# Patient Record
Sex: FEMALE | Race: WHITE
[De-identification: names, ages, dates, MRNs, and addresses within clinical notes are randomized per-mention and may not be internally consistent; named-entity substitution may affect disease eponyms.]

---

## 2019-07-17 ENCOUNTER — HOSPITAL ENCOUNTER (INPATIENT)
Dept: HOSPITAL 26 - MED | Age: 39
LOS: 3 days | Discharge: HOME | DRG: 720 | End: 2019-07-20
Attending: GENERAL PRACTICE | Admitting: GENERAL PRACTICE
Payer: MEDICAID

## 2019-07-17 VITALS — DIASTOLIC BLOOD PRESSURE: 83 MMHG | SYSTOLIC BLOOD PRESSURE: 115 MMHG

## 2019-07-17 VITALS — SYSTOLIC BLOOD PRESSURE: 106 MMHG | DIASTOLIC BLOOD PRESSURE: 68 MMHG

## 2019-07-17 VITALS — DIASTOLIC BLOOD PRESSURE: 94 MMHG | SYSTOLIC BLOOD PRESSURE: 146 MMHG

## 2019-07-17 VITALS — HEIGHT: 61 IN | BODY MASS INDEX: 21.52 KG/M2 | WEIGHT: 114 LBS

## 2019-07-17 DIAGNOSIS — E78.2: ICD-10-CM

## 2019-07-17 DIAGNOSIS — E87.6: ICD-10-CM

## 2019-07-17 DIAGNOSIS — Z83.3: ICD-10-CM

## 2019-07-17 DIAGNOSIS — Z82.49: ICD-10-CM

## 2019-07-17 DIAGNOSIS — E11.51: ICD-10-CM

## 2019-07-17 DIAGNOSIS — N13.6: ICD-10-CM

## 2019-07-17 DIAGNOSIS — Z86.73: ICD-10-CM

## 2019-07-17 DIAGNOSIS — E83.42: ICD-10-CM

## 2019-07-17 DIAGNOSIS — Z88.0: ICD-10-CM

## 2019-07-17 DIAGNOSIS — A41.9: Primary | ICD-10-CM

## 2019-07-17 DIAGNOSIS — Z98.891: ICD-10-CM

## 2019-07-17 DIAGNOSIS — E11.69: ICD-10-CM

## 2019-07-17 LAB
ALBUMIN FLD-MCNC: 3.7 G/DL (ref 3.4–5)
AMYLASE SERPL-CCNC: 40 U/L (ref 25–115)
AMYLASE SERPL-CCNC: 40 U/L (ref 25–115)
ANION GAP SERPL CALCULATED.3IONS-SCNC: 13.9 MMOL/L (ref 8–16)
ANION GAP SERPL CALCULATED.3IONS-SCNC: 16.3 MMOL/L (ref 8–16)
APPEARANCE UR: CLEAR
AST SERPL-CCNC: 20 U/L (ref 15–37)
BARBITURATES UR QL SCN: (no result) NG/ML
BENZODIAZ UR QL SCN: (no result) NG/ML
BILIRUB SERPL-MCNC: 0.5 MG/DL (ref 0–1)
BILIRUB UR QL STRIP: NEGATIVE
BUN SERPL-MCNC: 12 MG/DL (ref 7–18)
BUN SERPL-MCNC: 13 MG/DL (ref 7–18)
BZE UR QL SCN: (no result) NG/ML
CANNABINOIDS UR QL SCN: (no result) NG/ML
CHLORIDE SERPL-SCNC: 101 MMOL/L (ref 98–107)
CHLORIDE SERPL-SCNC: 105 MMOL/L (ref 98–107)
CHOLEST/HDLC SERPL: 4.2 {RATIO} (ref 1–4.5)
CO2 SERPL-SCNC: 25.7 MMOL/L (ref 21–32)
CO2 SERPL-SCNC: 26.5 MMOL/L (ref 21–32)
COLOR UR: YELLOW
CREAT SERPL-MCNC: 1 MG/DL (ref 0.6–1.3)
CREAT SERPL-MCNC: 1 MG/DL (ref 0.6–1.3)
ERYTHROCYTE [DISTWIDTH] IN BLOOD BY AUTOMATED COUNT: 12.8 % (ref 11.6–13.7)
GFR SERPL CREATININE-BSD FRML MDRD: 79 ML/MIN (ref 90–?)
GFR SERPL CREATININE-BSD FRML MDRD: 79 ML/MIN (ref 90–?)
GLUCOSE SERPL-MCNC: 336 MG/DL (ref 74–106)
GLUCOSE SERPL-MCNC: 442 MG/DL (ref 74–106)
GLUCOSE UR STRIP-MCNC: (no result) MG/DL
HCT VFR BLD AUTO: 43.7 % (ref 36–48)
HDLC SERPL-MCNC: 55 MG/DL (ref 40–60)
HGB BLD-MCNC: 14.6 G/DL (ref 12–16)
HGB UR QL STRIP: NEGATIVE
LDLC SERPL CALC-MCNC: 149 MG/DL (ref 60–100)
LEUKOCYTE ESTERASE UR QL STRIP: NEGATIVE
LIPASE SERPL-CCNC: 124 U/L (ref 73–393)
LIPASE SERPL-CCNC: 124 U/L (ref 73–393)
LYMPHOCYTES NFR BLD MANUAL: 6 % (ref 20–46)
MAGNESIUM SERPL-MCNC: 1.9 MG/DL (ref 1.8–2.4)
MCH RBC QN AUTO: 27 PG (ref 27–31)
MCHC RBC AUTO-ENTMCNC: 33 G/DL (ref 33–37)
MCV RBC AUTO: 82.1 FL (ref 80–94)
NITRITE UR QL STRIP: NEGATIVE
OPIATES UR QL SCN: (no result) NG/ML
PCP UR QL SCN: (no result) NG/ML
PH UR STRIP: 6 [PH] (ref 5–9)
PHOSPHATE SERPL-MCNC: 3.7 MG/DL (ref 2.5–4.9)
PLATELET # BLD AUTO: 317 K/UL (ref 140–450)
POTASSIUM SERPL-SCNC: 3 MMOL/L (ref 3.5–5.1)
POTASSIUM SERPL-SCNC: 3.4 MMOL/L (ref 3.5–5.1)
PROTHROMBIN TIME: 9.9 SECS (ref 10.8–13.4)
RBC # BLD AUTO: 5.32 MIL/UL (ref 4.2–5.4)
RBC #/AREA URNS HPF: (no result) /HPF (ref 0–5)
SODIUM SERPL-SCNC: 140 MMOL/L (ref 136–145)
SODIUM SERPL-SCNC: 142 MMOL/L (ref 136–145)
TRIGL SERPL-MCNC: 145 MG/DL (ref 30–150)
TSH SERPL DL<=0.05 MIU/L-ACNC: 0.98 UIU/ML (ref 0.34–3.74)
WBC # BLD AUTO: 9 K/UL (ref 4.8–10.8)
WBC,URINE: (no result) /HPF (ref 0–5)

## 2019-07-17 RX ADMIN — ACETAMINOPHEN PRN MG: 325 TABLET ORAL at 18:09

## 2019-07-17 RX ADMIN — SODIUM CHLORIDE SCH MLS/HR: 9 INJECTION, SOLUTION INTRAVENOUS at 18:49

## 2019-07-17 RX ADMIN — SODIUM CHLORIDE SCH MLS/HR: 9 INJECTION, SOLUTION INTRAVENOUS at 10:52

## 2019-07-17 RX ADMIN — INSULIN LISPRO PRN UNITS: 100 INJECTION, SOLUTION INTRAVENOUS; SUBCUTANEOUS at 18:21

## 2019-07-17 RX ADMIN — Medication SCH DEV: at 20:57

## 2019-07-17 RX ADMIN — INSULIN LISPRO PRN UNITS: 100 INJECTION, SOLUTION INTRAVENOUS; SUBCUTANEOUS at 21:02

## 2019-07-17 RX ADMIN — Medication SCH DEV: at 16:30

## 2019-07-17 RX ADMIN — KETOROLAC TROMETHAMINE SCH MG: 15 INJECTION, SOLUTION INTRAMUSCULAR; INTRAVENOUS at 18:09

## 2019-07-17 RX ADMIN — INSULIN LISPRO PRN UNITS: 100 INJECTION, SOLUTION INTRAVENOUS; SUBCUTANEOUS at 14:04

## 2019-07-17 NOTE — NUR
ADMINISTERED SCHEDULED MEDICATIONS, BUT HELD HUMULIN R IVP WHICH WE DO NOT CARRY ON THE 
FLOOR. DOCTOR AWARE. PER DR. RAMSEY. RECHECK GLUCOSE AND COVER WITH SLIDING SCALE HUMALOG SQ. 
ADMINISTERED 8 UNITS OF HUMALOG FOR GLUCOSE . PATIENT TOLERATED WELL. IV INFUSING WELL 
TO LEFT AC.

## 2019-07-17 NOTE — NUR
PAIN, FEVER, CHILLS AND NAUSEA HAVE ALL SUBSIDED. PATIENT COMFORTABLE, RESTING IN BED. 
FAMILY AT BEDSIDE.

## 2019-07-17 NOTE — NUR
RECIEVED PT AAOX4 NID , WITH BEARABLE PAIN AT THIS TIME , IV SITE INTACT AND PATENT , PLAN 
OF CARE DISCUSSED AND VERBALIZE UNDERSTANDING , CALL LIGHT WITHIN RFEACH , BED I N LOW 
POSITION , SIDERAILS UPX2 ,REALTIVES TA BEDSIDE.

## 2019-07-17 NOTE — NUR
Patient will be admitted to care of DR. CURIEL. Admited to [facility] .  Will go to 
room 121 A. Belongings list completed.  Report to ROBBY SCHULTZ. PT STABLE AT THE 
TIME OF TRANSFER.

## 2019-07-17 NOTE — NUR
MADE ROUNDS , RESPIRATION EVEN AND UNLABORED , NO FURTHER COMPLAIN MADE AT THIS TIME , CALL 
LIGHT WITHIN REACH .WILL CONT. TO MONITOR.

## 2019-07-17 NOTE — NUR
C/O ABD PAIN SINCE 05:00 THIS MORNING. PT REPORTS LOWER ABD PAIN AT 10/10 THAT 
RADIATES TO RT LOWER BACK. + N/V. PT APPEARS LETHARGIC AND TIRED. TAKES 
METFORMIN AT HOME FOR DM. PUT THE PT IN COMFORTABLE POSITION IN BED. SIDE RAILS 
UPX2. BED AT LOWER POSITION. WILL CONTINUE TO MONITOR PT.



MEDHX:DM

RX:DENIES

## 2019-07-17 NOTE — NUR
ADMINISTERED PRN ZOFRAN 4MG IV, FOR C/O NAUSEA, TYLENOL 650MG PO FOR FEVER .4 F AN C/O 
HEADACHE 5/10, AND TORADOL 15MG IV FOR C/O FLANK PAIN 6/10. PATIENT HAS CHILLS. EDUCATED 
PATIENT THAT CHILLS WILL SUBSIDE ONCE FEVER IS REDUCED.

## 2019-07-17 NOTE — NUR
PATIENT ARRIVED TO UNIT VIA GURNEY, AMBULATED TO BED WITH STEADY GAIT BUT IS WEAK FROM PAIN. 
IV INFUSION RUNNING WELL TO LEFT AC 22 GAUGE. PATIENT STATES PAIN IS TOLERABLE AND THAT IT 
ONLY HURTS WHEN SHE MOVES. ORIENTED PATIENT TO UNIT AND FLOOR. CALL LIGHT IN REACH. PATIENT 
VITALS STABLE ON RA. NO SIGNS OF DISTRESS .SAFETY PRECAUTIONS IN PLACE .WILL CONTINUE TO 
MONITOR.

## 2019-07-18 VITALS — DIASTOLIC BLOOD PRESSURE: 70 MMHG | SYSTOLIC BLOOD PRESSURE: 130 MMHG

## 2019-07-18 VITALS — DIASTOLIC BLOOD PRESSURE: 59 MMHG | SYSTOLIC BLOOD PRESSURE: 87 MMHG

## 2019-07-18 VITALS — DIASTOLIC BLOOD PRESSURE: 69 MMHG | SYSTOLIC BLOOD PRESSURE: 128 MMHG

## 2019-07-18 LAB
ANION GAP SERPL CALCULATED.3IONS-SCNC: 14.5 MMOL/L (ref 8–16)
BUN SERPL-MCNC: 15 MG/DL (ref 7–18)
CHLORIDE SERPL-SCNC: 105 MMOL/L (ref 98–107)
CO2 SERPL-SCNC: 22.2 MMOL/L (ref 21–32)
CREAT SERPL-MCNC: 0.8 MG/DL (ref 0.6–1.3)
EOSINOPHIL NFR BLD MANUAL: 1 % (ref 0–4)
ERYTHROCYTE [DISTWIDTH] IN BLOOD BY AUTOMATED COUNT: 12.7 % (ref 11.6–13.7)
GFR SERPL CREATININE-BSD FRML MDRD: 102 ML/MIN (ref 90–?)
GLUCOSE SERPL-MCNC: 277 MG/DL (ref 74–106)
HCT VFR BLD AUTO: 32.7 % (ref 36–48)
HGB BLD-MCNC: 10.8 G/DL (ref 12–16)
LYMPHOCYTES NFR BLD MANUAL: 7 % (ref 20–46)
MAGNESIUM SERPL-MCNC: 1.7 MG/DL (ref 1.8–2.4)
MCH RBC QN AUTO: 27 PG (ref 27–31)
MCHC RBC AUTO-ENTMCNC: 33 G/DL (ref 33–37)
MCV RBC AUTO: 82.2 FL (ref 80–94)
MONOCYTES NFR BLD MANUAL: 5 % (ref 5–12)
PHOSPHATE SERPL-MCNC: 2.5 MG/DL (ref 2.5–4.9)
PLATELET # BLD AUTO: 226 K/UL (ref 140–450)
POTASSIUM SERPL-SCNC: 3.7 MMOL/L (ref 3.5–5.1)
RBC # BLD AUTO: 3.98 MIL/UL (ref 4.2–5.4)
SODIUM SERPL-SCNC: 138 MMOL/L (ref 136–145)
T4 SERPL-MCNC: 10.2 UG/DL (ref 4.5–12)
WBC # BLD AUTO: 18.1 K/UL (ref 4.8–10.8)

## 2019-07-18 RX ADMIN — Medication SCH DEV: at 06:46

## 2019-07-18 RX ADMIN — INSULIN LISPRO PRN UNITS: 100 INJECTION, SOLUTION INTRAVENOUS; SUBCUTANEOUS at 13:25

## 2019-07-18 RX ADMIN — Medication SCH MG: at 09:10

## 2019-07-18 RX ADMIN — Medication SCH DEV: at 20:37

## 2019-07-18 RX ADMIN — INSULIN LISPRO PRN UNITS: 100 INJECTION, SOLUTION INTRAVENOUS; SUBCUTANEOUS at 06:51

## 2019-07-18 RX ADMIN — Medication SCH DEV: at 11:30

## 2019-07-18 RX ADMIN — LEVOFLOXACIN SCH MLS/HR: 5 INJECTION, SOLUTION INTRAVENOUS at 09:10

## 2019-07-18 RX ADMIN — SODIUM CHLORIDE SCH MLS/HR: 9 INJECTION, SOLUTION INTRAVENOUS at 18:40

## 2019-07-18 RX ADMIN — SODIUM CHLORIDE SCH MLS/HR: 9 INJECTION, SOLUTION INTRAVENOUS at 23:42

## 2019-07-18 RX ADMIN — KETOROLAC TROMETHAMINE SCH MG: 15 INJECTION, SOLUTION INTRAMUSCULAR; INTRAVENOUS at 03:24

## 2019-07-18 RX ADMIN — SODIUM CHLORIDE SCH MLS/HR: 9 INJECTION, SOLUTION INTRAVENOUS at 00:41

## 2019-07-18 RX ADMIN — KETOROLAC TROMETHAMINE SCH MG: 15 INJECTION, SOLUTION INTRAMUSCULAR; INTRAVENOUS at 09:11

## 2019-07-18 RX ADMIN — ATORVASTATIN CALCIUM SCH MG: 20 TABLET, FILM COATED ORAL at 09:10

## 2019-07-18 RX ADMIN — SODIUM CHLORIDE SCH MLS/HR: 9 INJECTION, SOLUTION INTRAVENOUS at 09:22

## 2019-07-18 RX ADMIN — INSULIN LISPRO PRN UNITS: 100 INJECTION, SOLUTION INTRAVENOUS; SUBCUTANEOUS at 17:50

## 2019-07-18 RX ADMIN — ACETAMINOPHEN PRN MG: 325 TABLET ORAL at 17:46

## 2019-07-18 RX ADMIN — Medication SCH DEV: at 16:30

## 2019-07-18 NOTE — NUR
RECEIVED BEDSIDE REPORT FROM DAY SHIFT NURSE. PATIENT IS AWAKE, ALERT, AND COOPERATIVE. 
RESPIRATION EVEN UNLABORED ON ROOM AIR. NO DISTRESS NOTED. SKIN IS WARM AND DRY. IV PATENT 
AND INTACT. DENIES PAIN. ALL SAFETY MEASURES IN PLACE. PLAN OF CARE WAS DISCUSSED. CALL 
LIGHT WITHIN REACH AND VERBALIZES ITS USE. WILL CONTINUE TO MONITOR.

## 2019-07-18 NOTE — NUR
ADMINISTERED SCHEDULED MEDICATIONS. PATIENT TOLERATED WELL. NO SIGNS OF DISTRESS ON RA. 
SAFETY PRECAUTIONS IN PLACE. CALL LIGHT IN REACH. PATIENT HAS PAIN 4/10 BUT DID NOT WANT 
SCHEDULED IM TORADOL. GAVE PRN IV TORADOL, BOTH ON ORDER.

## 2019-07-18 NOTE — NUR
MADE ROUNDS . V/S WNL , RESPIRATION EVEN AND UNLABORED , NO COMPLAIN MADE AT THIS TIME , 
CALL LIGHT WITHIN RAECH . WILL CONT. TO MONITOR.

## 2019-07-18 NOTE — NUR
BP SLIGHTLY LOW 87/58 BUT PATIENT DENIES DIZZINESS, LIGHTHEADEDNESS OR HEADACHE. EDUCATED 
PATIENT TO SIT AT EDGE OF BED BEFORE AMBULATING AND TO CALL ME IF SHE EXPERIENCES ANY 
DIZZINESS OR LIGHTHEADEDNESS. PATIENT VERBALIZED UNDERSTANDING. ALL OTHER VITALS ARE WNL. 
REVIEWED MORNING MEDICATIONS INCLUDING IM TORADOL. PATIENT DOES HAVE MILD 3/10 LOWER ABD 
PAIN, BUT DOES NOT WANT THE IM TORADOL. WILL GIVE THE PRN IV TORADOL AND INFORM DOCTOR OF 
PATIENT PREFERENCE.

## 2019-07-18 NOTE — NUR
MADE ROUNDS , RESPIRATION EVEN AND UNLABORED , NO COMPLAIN MADE AT THIS TIME , CALL LIGHT 
WITHIN REACH , WILL CONT. TO MONITOR.

## 2019-07-18 NOTE — NUR
PATIENT HAS BEEN SCREENED AND CATEGORIZED AS MODERATE NUTRITION RISK. PATIENT WILL BE SEEN 
WITHIN 3-5 DAYS OF ADMISSION.



07/20/19 07/22/19



CAROL [address]LE[address] RD

## 2019-07-18 NOTE — NUR
ADMINISTERED 6 UNITS HUMALOG FOR GLUCOSE . PATIENT WAS PREVIOUSLY NPO FOR LUNCH DUE TO 
A PENDING ULTRASOUND. ULTRASOUND HAS FINISHED. PATIENT EATING LUNCH. INSULIN COVERAGE GIVEN. 
ALSO ADMINISTERED METFORMIN 500MG PO AND MAX OXIDE 800 MG PO. PATIENT TOLERATED ALL 
MEDICAITONS WILL. EATING LUNCH. NO SIGNS OF DISTRESS. WILL CONTINUE TO MONITOR.

## 2019-07-18 NOTE — NUR
RECEIVED REPORT FROM NIGHT SHIFT RN, JORDAN. PATIENT IS SLEEPING. VISIBLE CHEST RISE AND NO 
SIGNS OF DISTRESS ON RA. IV INFUSING TO LEFT AC 20 GAUGE CATHETER. BED LOW, CALL LIGHT IN 
REACH. WILL CONTINUE TO MONITOR.

## 2019-07-18 NOTE — NUR
PATIENT IN BED TALKING ON THE PHONE. RESPIRATION EVEN UNLABORED ON ROOM AIR. NO DISTRESS 
NOTED. WILL CONTINUE TO MONITOR.

## 2019-07-18 NOTE — NUR
INITIAL ASSESSMENT DONE. VITALS WERE TAKEN. PATIENT IN CONDITION STABLE. NO DISTRESS NOTED. 
WILL CONTINUE TO MONITOR.

## 2019-07-19 VITALS — DIASTOLIC BLOOD PRESSURE: 72 MMHG | SYSTOLIC BLOOD PRESSURE: 134 MMHG

## 2019-07-19 VITALS — DIASTOLIC BLOOD PRESSURE: 67 MMHG | SYSTOLIC BLOOD PRESSURE: 111 MMHG

## 2019-07-19 VITALS — SYSTOLIC BLOOD PRESSURE: 130 MMHG | DIASTOLIC BLOOD PRESSURE: 75 MMHG

## 2019-07-19 LAB
ANION GAP SERPL CALCULATED.3IONS-SCNC: 10.8 MMOL/L (ref 8–16)
BASOPHILS # BLD AUTO: 0 K/UL (ref 0–0.22)
BASOPHILS NFR BLD AUTO: 0.2 % (ref 0–2)
BUN SERPL-MCNC: 11 MG/DL (ref 7–18)
CHLORIDE SERPL-SCNC: 105 MMOL/L (ref 98–107)
CO2 SERPL-SCNC: 25 MMOL/L (ref 21–32)
CREAT SERPL-MCNC: 0.8 MG/DL (ref 0.6–1.3)
EOSINOPHIL # BLD AUTO: 0.1 K/UL (ref 0–0.4)
EOSINOPHIL NFR BLD AUTO: 0.9 % (ref 0–4)
ERYTHROCYTE [DISTWIDTH] IN BLOOD BY AUTOMATED COUNT: 12.8 % (ref 11.6–13.7)
GFR SERPL CREATININE-BSD FRML MDRD: 102 ML/MIN (ref 90–?)
GLUCOSE SERPL-MCNC: 240 MG/DL (ref 74–106)
HCT VFR BLD AUTO: 32.2 % (ref 36–48)
HGB BLD-MCNC: 10.8 G/DL (ref 12–16)
LYMPHOCYTES # BLD AUTO: 1.3 K/UL (ref 2.5–16.5)
LYMPHOCYTES NFR BLD AUTO: 9.4 % (ref 20.5–51.1)
MCH RBC QN AUTO: 27 PG (ref 27–31)
MCHC RBC AUTO-ENTMCNC: 33 G/DL (ref 33–37)
MCV RBC AUTO: 81.8 FL (ref 80–94)
MONOCYTES # BLD AUTO: 0.8 K/UL (ref 0.8–1)
MONOCYTES NFR BLD AUTO: 5.8 % (ref 1.7–9.3)
NEUTROPHILS # BLD AUTO: 11.6 K/UL (ref 1.8–7.7)
NEUTROPHILS NFR BLD AUTO: 83.7 % (ref 42.2–75.2)
PLATELET # BLD AUTO: 213 K/UL (ref 140–450)
POTASSIUM SERPL-SCNC: 3.8 MMOL/L (ref 3.5–5.1)
RBC # BLD AUTO: 3.94 MIL/UL (ref 4.2–5.4)
SODIUM SERPL-SCNC: 137 MMOL/L (ref 136–145)
WBC # BLD AUTO: 13.8 K/UL (ref 4.8–10.8)

## 2019-07-19 RX ADMIN — Medication SCH DEV: at 11:38

## 2019-07-19 RX ADMIN — Medication SCH MG: at 08:12

## 2019-07-19 RX ADMIN — SODIUM CHLORIDE SCH MLS/HR: 9 INJECTION, SOLUTION INTRAVENOUS at 18:37

## 2019-07-19 RX ADMIN — INSULIN LISPRO PRN UNITS: 100 INJECTION, SOLUTION INTRAVENOUS; SUBCUTANEOUS at 11:40

## 2019-07-19 RX ADMIN — LEVOFLOXACIN SCH MLS/HR: 5 INJECTION, SOLUTION INTRAVENOUS at 09:02

## 2019-07-19 RX ADMIN — ATORVASTATIN CALCIUM SCH MG: 20 TABLET, FILM COATED ORAL at 08:11

## 2019-07-19 RX ADMIN — Medication SCH DEV: at 16:30

## 2019-07-19 RX ADMIN — Medication SCH DEV: at 06:26

## 2019-07-19 RX ADMIN — Medication SCH DEV: at 20:51

## 2019-07-19 RX ADMIN — INSULIN LISPRO PRN UNITS: 100 INJECTION, SOLUTION INTRAVENOUS; SUBCUTANEOUS at 21:05

## 2019-07-19 RX ADMIN — INSULIN LISPRO PRN UNITS: 100 INJECTION, SOLUTION INTRAVENOUS; SUBCUTANEOUS at 06:24

## 2019-07-19 RX ADMIN — INSULIN LISPRO PRN UNITS: 100 INJECTION, SOLUTION INTRAVENOUS; SUBCUTANEOUS at 17:57

## 2019-07-19 RX ADMIN — SODIUM CHLORIDE SCH MLS/HR: 9 INJECTION, SOLUTION INTRAVENOUS at 09:06

## 2019-07-19 NOTE — NUR
RECEIVED BEDSIDE REPORT FROM DAY SHIFT NURSE. PATIENT IS SLEEPING AROUSABLE BY NAME. 
RESPIRATION EVEN UNLABORED ON ROOM AIR. NO DISTRESS NOTED. SKIN IS WARM AND DRY. IV PATENT 
AND INTACT. PLAN OF CARE WAS DISCUSSED. ALL SAFETY MEASURES IN PLACE. CALL LIGHT WITHIN 
REACH AND VERBALIZES ITS USE. WILL CONTINUE TO MONITOR.

## 2019-07-19 NOTE — NUR
PATIENT AWAKE AND TALKING ON HER PHONE AT THIS TIME. DENIED PAIN. RESPIRATION EVEN AND 
UNLABORED ON RA. NO SIGNS OF DISTRESS NOTED. SAFETY MEASURES IN PLACE. BED IN LOW POSITION 
AND CALL LIGHT WITHIN REACH. INSTRUCTED PATIENT TO USE THE CALL LIGHT FOR ANY ASSISTANCE AND 
PATIENT WAS AWARE.

## 2019-07-19 NOTE — NUR
INITIAL ASSESSMENT DONE. VITALS WERE TAKEN. NO DISTRESS NOTED. PATIENT IN STABLE CONDITION. 
WILL CONTINUE TO MONITOR.

## 2019-07-19 NOTE — NUR
PATIENT AWAKE AND WATCHING TV ON BED AT THIS TIME. DENIED PAIN. NO SIGNS OF DISTRESS NOTED. 
SAFETY MEASURES IN PLACE. BED IN LOW POSITION AND CALL LIGHT WITHIN REACH. INSTRUCTED 
PATIENT TO USE THE CALL LIGHT FOR ANY ASSISTANCE AND PATIENT WAS AWARE.

## 2019-07-19 NOTE — NUR
PATIENT AWAKE AND TALKING ON HER PHONE AT THIS TIME. DENIED PAIN. NO SIGNS OF DISTRESS 
NOTED. SAFETY MEASURES IN PLACE. BED IN LOW POSITION AND CALL LIGHT WITHIN REACH. INSTRUCTED 
PATIENT TO USE THE CALL LIGHT FOR ANY ASSISTANCE AND PATIENT WAS AWARE.

## 2019-07-19 NOTE — NUR
RECEIVED BEDSIDE REPORT FROM NIGHT SHIFT NURSE. PATIENT IS SLEEPING ON BED AT THIS TIME. 
RESPIRATION EVEN AND UNLABORED ON RA. DENIED PAIN AND SOB. NO SIGNS OF DISTRESS NOTED. IV ON 
R HAND 22G, CLEAN AND DRY, INFUSING AS PER MD ORDER. SKIN INTACT AND CLEAN. PATIENT IS 
AMBULATE AND CONTINENT. SAFETY MEASURES IN PLACE. BED ON LOW POSITION AND CALL LIGHT WITHIN 
REACH.

## 2019-07-19 NOTE — NUR
PATIENT AWAKE AND RESTING ON BED AT THIS TIME. DENIED PAIN. RESPIRATION EVEN AND UNLABORED 
ON RA. NO SIGNS OF DISTRESS NOTED. SAFETY MEASURES IN PLACE. BED IN LOW POSITION AND CALL 
LIGHT WITHIN REACH. INSTRUCTED PATIENT TO USE THE CALL LIGHT FOR ANY ASSISTANCE AND PATIENT 
WAS AWARE.

## 2019-07-19 NOTE — NUR
CHECKED PATIENT. PATIENT SLEEPING RESPIRATION EVEN UNLABORED ON ROOM AIR. NO DISTRESS NOTED, 
WILL CONTINUE TO MONITOR.

## 2019-07-19 NOTE — NUR
PATIENT IV INFILTRATED NO ACTIVE BLEEDING SEEN. CANNULA INTACT. NEW IV INSERTED 22G. WILL 
CONTINUE TO MONITOR.

## 2019-07-19 NOTE — NUR
ENDORSED PATIENT AT BEDSIDE TO NIGHT SHIFT NURSE FOR CONTINUITY OF CARE. PATIENT IS IN 
STABLE CONDITION. NO SIGNS OF DISTRESS NOTED. SAFETY MEASURES IN PLACE. BED IN LOW POSITION 
AND CALL LIGHT WITHIN REACH.

## 2019-07-19 NOTE — NUR
PATIENT IS RESTING ON BED AT THIS TIME. NO SIGNS OF DISTRESS NOTED. SAFETY MEASURES IN 
PLACE. BED IN LOW POSITION AND CALL LIGHT WITHIN REACH. INSTRUCTED PATIENT TO USE THE CALL 
LIGHT FOR ANY ASSISTANCE AND PATIENT WAS AWARE.

## 2019-07-20 VITALS — DIASTOLIC BLOOD PRESSURE: 83 MMHG | SYSTOLIC BLOOD PRESSURE: 134 MMHG

## 2019-07-20 VITALS — SYSTOLIC BLOOD PRESSURE: 143 MMHG | DIASTOLIC BLOOD PRESSURE: 77 MMHG

## 2019-07-20 LAB
ANION GAP SERPL CALCULATED.3IONS-SCNC: 11 MMOL/L (ref 8–16)
BASOPHILS # BLD AUTO: 0 K/UL (ref 0–0.22)
BASOPHILS NFR BLD AUTO: 0.2 % (ref 0–2)
BUN SERPL-MCNC: 4 MG/DL (ref 7–18)
CHLORIDE SERPL-SCNC: 107 MMOL/L (ref 98–107)
CO2 SERPL-SCNC: 24.6 MMOL/L (ref 21–32)
CREAT SERPL-MCNC: 0.6 MG/DL (ref 0.6–1.3)
EOSINOPHIL # BLD AUTO: 0.2 K/UL (ref 0–0.4)
EOSINOPHIL NFR BLD AUTO: 2.4 % (ref 0–4)
ERYTHROCYTE [DISTWIDTH] IN BLOOD BY AUTOMATED COUNT: 12.8 % (ref 11.6–13.7)
GFR SERPL CREATININE-BSD FRML MDRD: 142 ML/MIN (ref 90–?)
GLUCOSE SERPL-MCNC: 163 MG/DL (ref 74–106)
HCT VFR BLD AUTO: 32.3 % (ref 36–48)
HGB BLD-MCNC: 10.8 G/DL (ref 12–16)
LYMPHOCYTES # BLD AUTO: 1.6 K/UL (ref 2.5–16.5)
LYMPHOCYTES NFR BLD AUTO: 17.2 % (ref 20.5–51.1)
MCH RBC QN AUTO: 27 PG (ref 27–31)
MCHC RBC AUTO-ENTMCNC: 34 G/DL (ref 33–37)
MCV RBC AUTO: 81.5 FL (ref 80–94)
MONOCYTES # BLD AUTO: 0.7 K/UL (ref 0.8–1)
MONOCYTES NFR BLD AUTO: 6.9 % (ref 1.7–9.3)
NEUTROPHILS # BLD AUTO: 7 K/UL (ref 1.8–7.7)
NEUTROPHILS NFR BLD AUTO: 73.3 % (ref 42.2–75.2)
PLATELET # BLD AUTO: 241 K/UL (ref 140–450)
POTASSIUM SERPL-SCNC: 3.6 MMOL/L (ref 3.5–5.1)
RBC # BLD AUTO: 3.96 MIL/UL (ref 4.2–5.4)
SODIUM SERPL-SCNC: 139 MMOL/L (ref 136–145)
WBC # BLD AUTO: 9.6 K/UL (ref 4.8–10.8)

## 2019-07-20 RX ADMIN — Medication SCH DEV: at 06:30

## 2019-07-20 RX ADMIN — ATORVASTATIN CALCIUM SCH MG: 20 TABLET, FILM COATED ORAL at 08:56

## 2019-07-20 RX ADMIN — SODIUM CHLORIDE SCH MLS/HR: 9 INJECTION, SOLUTION INTRAVENOUS at 10:25

## 2019-07-20 RX ADMIN — Medication SCH DEV: at 11:40

## 2019-07-20 RX ADMIN — LEVOFLOXACIN SCH MLS/HR: 5 INJECTION, SOLUTION INTRAVENOUS at 08:56

## 2019-07-20 RX ADMIN — Medication SCH MG: at 08:55

## 2019-07-20 RX ADMIN — SODIUM CHLORIDE SCH MLS/HR: 9 INJECTION, SOLUTION INTRAVENOUS at 02:22

## 2019-07-20 RX ADMIN — INSULIN LISPRO PRN UNITS: 100 INJECTION, SOLUTION INTRAVENOUS; SUBCUTANEOUS at 11:37

## 2019-07-20 NOTE — NUR
VITALS WERE TAKEN. NO DISTRESS NOTED. DENIES PAIN. PATIENT IN STABLE CONDITION. WILL 
CONTINUE TO MONITOR.

## 2019-07-20 NOTE — NUR
RECEIVED BEDSIDE REPORT FROM ROBBY HOWELL. PT STABLE, SLEEPING, BUT EASILY AROUSABLE. NO SIGNS 
OF DISTRESS NOTED. DENIES PAIN OR SOB. NO REDNESS, SWELLING, OR INFLAMMATION NOTED ON IV 
SITE. CALL BELL WITHIN REACH. BED IN LOWEST POSITION. SAFETY MEASURES IN PLACE. PLAN OF CARE 
REVIEWED. 

-------------------------------------------------------------------------------

Addendum: 07/20/19 at 0900 by Sidney Marc RN

-------------------------------------------------------------------------------

THIS DOCUMENTATION WAS DONE AT 0726 ON 7/20/19.

## 2019-07-20 NOTE — NUR
D/C INSTRUCTIONS, PAPERWORK, AND PRESCRIPTION GIVEN, PT VERBALIZED UNDERSTANDING. QUESTIONS 
AND CONCERNS WERE ADDRESSED. D/C IV, CATHETER TIP INTACT, BLEEDING CONTROLLED. PT STABLE, 
COMMUNICATES APPROPRIATELY WITH STAFF, AMBULATES WITH STEADY GAIT. SKIN INTACT, VACCINES 
N/A. PT TOOK ALL OF BELONGINGS HOME. PT PICKED UP BY SISTER. ESCORTED PT AND FAMILY TO THE 
[facility]. PT WILL BE GOING BACK HOME.